# Patient Record
Sex: MALE | Race: WHITE | Employment: OTHER | URBAN - METROPOLITAN AREA
[De-identification: names, ages, dates, MRNs, and addresses within clinical notes are randomized per-mention and may not be internally consistent; named-entity substitution may affect disease eponyms.]

---

## 2018-04-12 ENCOUNTER — HOSPITAL ENCOUNTER (OUTPATIENT)
Dept: GENERAL RADIOLOGY | Age: 78
Discharge: HOME | End: 2018-04-12
Attending: FAMILY MEDICINE
Payer: MEDICARE

## 2018-04-12 DIAGNOSIS — R13.10 DYSPHAGIA: ICD-10-CM

## 2018-04-12 DIAGNOSIS — R13.12 OROPHARYNGEAL DYSPHAGIA: ICD-10-CM

## 2018-04-12 PROCEDURE — G8997 SWALLOW GOAL STATUS: HCPCS | Performed by: SPEECH-LANGUAGE PATHOLOGIST

## 2018-04-12 PROCEDURE — 92611 MOTION FLUOROSCOPY/SWALLOW: CPT | Performed by: SPEECH-LANGUAGE PATHOLOGIST

## 2018-04-12 PROCEDURE — G8996 SWALLOW CURRENT STATUS: HCPCS | Performed by: SPEECH-LANGUAGE PATHOLOGIST

## 2018-04-12 PROCEDURE — G8998 SWALLOW D/C STATUS: HCPCS | Performed by: SPEECH-LANGUAGE PATHOLOGIST

## 2018-04-12 PROCEDURE — 74230 X-RAY XM SWLNG FUNCJ C+: CPT

## 2018-04-12 NOTE — PROGRESS NOTES
[id number]    Speech Pathology Modified barium swallow Study with cms g codes  Patient: Leanne Vale (89 y.o. male)  Date: 4/12/2018      SUBJECTIVE:   Patient accompanied by his wife who was an accurate and reliable historian. She reports he has been receiving SLP tx at [facility] and is on a [facility] soft diet and nectar thick liquids. Test today is to determine ability to upgrade to thin liquids. WIFE REPORTED PATIENT IS NOT ON ANY IMMUNOSUPPRESSANT DRUGS, HAS LIMITED MOBILITY. OBJECTIVE:   Past Medical History: None on file in [facility]. Per paperwork accompanying patient, diagnoses include: dyphagia, aphasia, CVA, dysarthria, HLD, GERD,   No past medical history on file. No past surgical history on file. Current Dietary Status:  [facility] SOFT/CHOPPED AND NECTAR LIQUIDS PER  WIFE. Radiologist:    Film Views: Fluoro;AP  Patient Position: upright    Trial 1: Trial 2:   Consistency Presented: Nectar thick liquid; Thin liquid; Solid;Puree     How Presented: SLP-fed/presented;Self-fed/presented;Cup/sip;Cup/gulp                 Bolus Formation/Control: No impairment:    :     Propulsion: No impairment     Oral Residue: None     Initiation of Swallow: Triggered at vallecula;Triggered at pyriform sinus(es)     Timing:  (very mild delay to WNL)     Penetration: Trace; Flash/transient (WITH THIN FROM SPOON, PENETRATION OF VERY TRACE AMOUNT TO THE CORDS X 1.  MAY HAVE PASSED BELOW CORDS BUT STAYED AROUND LEVEL OF TVC. WITH CUP SIPS, THERE WAS UNDERCOATING OF EPIGLOTTIS  BUT NO FURTHER PENETRATION TO LEVEL OF TVF. )     Aspiration/Timing:  (VERY TRACE ASPIRATION WITH LARGE SIP FROM SPOON. NONE FROM CUP. IMMEDIATE STRONG COUGH. )     Pharyngeal Clearance: 10-50% (EPIGLOTTIS TILTS BUT DOES NOT FULLY INVERT, LEADING TO SIGNIFICANT RESIDUE WITH ALL CONSISTENCIES.  PATIENT WAS CUED TO TAKE ADDITIONAL DRY SWALLOW BUT HE HAD DIFFICULTY EITHER COMPLETING OR FOLLOWING DIRECTIONS FOR THIS. )     Attempted Modifications: Alternate liquids/solids; Chin tuck; Left head turn;Double swallow     Effective Modifications: Right head turn; Chin tuck (BOTH OF THESE DECREASED OR ELIMINATED HIS TRACE PENTRATION. UNSURE IF PATIENT WOULD BE ABLE TO FOLLOW THIS WITHOUT CONSTANT CUES BUT WAS VERY EFFECTIVE. Rolanda Purcell )     Cues for Modifications: Maximal                         Decreased Tongue Base Retraction?: No  Laryngeal Elevation: Inadequate epiglottic inversion; Incomplete laryngeal closure  Aspiration/Penetration Score: 6 (Aspiration-Contrast passes below the cords/glottis, and is cleared)  (WITH SPOON SIP OF THIN ONLY)  Pharyngeal Symmetry: Not assessed  Pharyngeal-Esophageal Segment: No impairment  Pharyngeal Dysfunction: None    Oral Phase Severity: Minimal  Pharyngeal Phase Severity: Mild moderate    In compliance with CMSs Claims Based Outcome Reporting, the following G-code set was chosen for this patient based the use of the NOMS functional outcome to quantify this patient's level of swallowing impairment. Using the NOMS, the patient was determined to be at level 5 for swallow function which correlates with the CJ= 20-39% level of severity. Based on the objective assessment provided within this note, the current, goal, and discharge g-codes are as follows:    Swallow  Swallowing:   Swallow Current Status CJ= 20-39%   Swallow Goal Status CJ= 20-39%   Swallow D/C Status CJ= 20-39%      NOMS Swallowing Levels:  Level 1 (CN): NPO  Level 2 (CM): NPO but takes consistency in therapy  Level 3 (CL): Takes less than 50% of nutrition p.o. and continues with nonoral feedings; and/or safe with mod cues; and/or max diet restriction  Level 4 (CK):  Safe swallow but needs mod cues; and/or mod diet restriction; and/or still requires some nonoral feeding/supplements  Level 5 (CJ): Safe swallow with min diet restriction; and/or needs min cues  Level 6 (CI): Independent with p.o.; rare cues; usually self cues; may need to avoid some foods or needs extra time  Level 7 ([address] [place] [address]): Independent for all p.o.  JEANNE. (2003). National Outcomes Measurement System (NOMS): Adult Speech-Language Pathology User's Guide. ASSESSMENT :  Based on the objective data described above, the patient presents with trace aspiration which cleared with spontaneous cough x 1, from a spoon sip. No aspiration with cup sips, but there was consistent shallow penetration with cup sips. Head turn and chin tuck both eliminated penetration but unsure if patient would consistently follow these. In any case, patient had silent penetration but not silent aspiration and aspiration of thin liquids was not repeated. Appears able to upgrade to thin liquids IF significant coughing with thins is not seen at bedside/meals/SLP sessions with thin sips. PLAN/RECOMMENDATIONS :  1. Allow thin liquids assuming no significant cough with these at bedside. 2. If patient does have increased cough with liquids sips at meals, would implement head turn or chin tuck, but patient would likely need constant cues for these so hopefully will be able to tolerate thins without. Can revert to nectar if needed but hopeully will not be necessary. 3. No significant difficulty with solids on this exam. He does have significant residue but also had thorough chewing. Epiglottis moves but does not fully invert. Will likely need continued A/supervision with crunchy/chewy solids but residue is contained in valleculae and pyriforms, does not approach airway. Will need small bites. Would benefit from cued dry swallows but patient had difficulty initiating this.     4. Strict oral care/discussed with wife while we were awaiting MD.      COMMUNICATION/EDUCATION:   The above findings and recommendations were discussed with: wife, will send report to Physician    Thank you for this referral.  Malena Doe, SLP  Time Calculation: 30 mins